# Patient Record
Sex: FEMALE | Race: OTHER | URBAN - METROPOLITAN AREA
[De-identification: names, ages, dates, MRNs, and addresses within clinical notes are randomized per-mention and may not be internally consistent; named-entity substitution may affect disease eponyms.]

---

## 2023-11-14 ENCOUNTER — OFFICE VISIT (OUTPATIENT)
Dept: URGENT CARE | Facility: CLINIC | Age: 63
End: 2023-11-14
Payer: MEDICARE

## 2023-11-14 VITALS
DIASTOLIC BLOOD PRESSURE: 88 MMHG | RESPIRATION RATE: 22 BRPM | OXYGEN SATURATION: 98 % | SYSTOLIC BLOOD PRESSURE: 138 MMHG | HEART RATE: 58 BPM | TEMPERATURE: 97.7 F

## 2023-11-14 DIAGNOSIS — M54.9 LEFT-SIDED BACK PAIN, UNSPECIFIED BACK LOCATION, UNSPECIFIED CHRONICITY: Primary | ICD-10-CM

## 2023-11-14 PROCEDURE — 99213 OFFICE O/P EST LOW 20 MIN: CPT | Performed by: PHYSICIAN ASSISTANT

## 2023-11-14 PROCEDURE — G0463 HOSPITAL OUTPT CLINIC VISIT: HCPCS | Performed by: PHYSICIAN ASSISTANT

## 2023-11-14 RX ORDER — HYDROCHLOROTHIAZIDE 25 MG/1
25 TABLET ORAL DAILY
COMMUNITY

## 2023-11-14 RX ORDER — MELOXICAM 15 MG/1
15 TABLET ORAL DAILY
COMMUNITY
Start: 2023-09-25

## 2023-11-14 RX ORDER — METHOCARBAMOL 500 MG/1
500 TABLET, FILM COATED ORAL 3 TIMES DAILY
Qty: 12 TABLET | Refills: 0 | Status: SHIPPED | OUTPATIENT
Start: 2023-11-14

## 2023-11-14 RX ORDER — ESOMEPRAZOLE MAGNESIUM 40 MG/1
CAPSULE, DELAYED RELEASE ORAL
COMMUNITY
Start: 2023-08-10

## 2023-11-14 RX ORDER — ERGOCALCIFEROL 1.25 MG/1
CAPSULE ORAL
COMMUNITY

## 2023-11-14 RX ORDER — BACLOFEN 10 MG/1
10 TABLET ORAL
COMMUNITY
Start: 2023-09-25 | End: 2023-11-14

## 2023-11-14 RX ORDER — IBUPROFEN 800 MG/1
TABLET ORAL
COMMUNITY
Start: 2023-09-25

## 2023-11-14 NOTE — PROGRESS NOTES
[facility]        NAME: Alecia Macias is a 70 y.o. female  : 1960    MRN: [id number]  DATE: 2023  TIME: 4:04 PM    Assessment and Plan   Left-sided back pain, unspecified back location, unspecified chronicity [M54.9]  1. Left-sided back pain, unspecified back location, unspecified chronicity  methocarbamol (ROBAXIN) 500 mg tablet            Patient Instructions   Take ibuprofen and Robaxin  Make sure you are taking the medication with food. Do not drive or operate heavy machinery while taking Robaxin  Patient is to apply heat 20 minutes on and than 20 minutes off. Patient can continue to use icy/hot or try otc lidocaine patches but neither should be applied while using heat. Rest (for no longer than 24 hours)  Stretching exercises  Alternate ice and heat  Consider physical therapy if no improvement after 1 week  F/u with PCP in 1 week if back pain is not improving  Follow up with chiropractic or orthopedic if continues >1 month  Report to the ER with worsening pain, lower extremity numbness/tingling/weakness or loss of bowel/bladder function. Follow up with PCP in 3-5 days. Proceed to  ER if symptoms worsen. Advised patient to hold baclofen while taking robaxin  Chief Complaint     Chief Complaint   Patient presents with    Back Pain     Pt c/o left side mid to lower back pain that started yesterday after driving for 10 hours in the car. History of Present Illness       HPI  This is a 47-year-old female here complaining of left-sided lower back pain since yesterday. Patient notes she sat in the car from [place] [facility] for 10 hours and then frequent to [facility] to visit family. From [facility] she drove to the [facility]. Patient says of sitting yesterday. She denies noting in the upper extremities, fever, chills, shortness of breath or chest pain. Patient denies any injury or trauma. She has been taking ibuprofen for symptoms.   She notes most of her pain occurs with movements which she rates an 8 out of 10. Review of Systems   Review of Systems   Constitutional:  Negative for chills and fever. Respiratory:  Negative for shortness of breath. Cardiovascular:  Negative for chest pain. Gastrointestinal:  Negative for diarrhea and vomiting. Musculoskeletal:  Positive for back pain. Neurological:  Negative for weakness and numbness. Current Medications       Current Outpatient Medications:     ergocalciferol (VITAMIN D2) 50,000 units, 1 CAP(S) ORALLY EVERY TWO WEEKS, Disp: , Rfl:     esomeprazole (NexIUM) 40 MG capsule, Do not open capsule., Disp: , Rfl:     hydrochlorothiazide (HYDRODIURIL) 25 mg tablet, Take 25 mg by mouth daily, Disp: , Rfl:     ibuprofen (MOTRIN) 800 mg tablet, 1 TAB(S) ORALLY 3 TIMES A DAY AS NEEDED FOR SEVERE PAIN 100 DAYS, Disp: , Rfl:     meloxicam (MOBIC) 15 mg tablet, Take 15 mg by mouth daily, Disp: , Rfl:     methocarbamol (ROBAXIN) 500 mg tablet, Take 1 tablet (500 mg total) by mouth 3 (three) times a day, Disp: 12 tablet, Rfl: 0    Current Allergies     Allergies as of 11/14/2023    (No Known Allergies)            The following portions of the patient's history were reviewed and updated as appropriate: allergies, current medications, past family history, past medical history, past social history, past surgical history and problem list.     Past Medical History:   Diagnosis Date    Arthritis     Hypertension        Past Surgical History:   Procedure Laterality Date    HERNIA REPAIR         History reviewed. No pertinent family history. Medications have been verified. Objective   /88   Pulse 58   Temp 97.7 °F (36.5 °C) (Oral)   Resp 22   SpO2 98%        Physical Exam     Physical Exam  Vitals and nursing note reviewed. Constitutional:       General: She is not in acute distress. Appearance: Normal appearance. She is not ill-appearing or toxic-appearing.    Cardiovascular:      Rate and Rhythm: Normal rate and regular rhythm. Pulmonary:      Effort: Pulmonary effort is normal.      Breath sounds: Normal breath sounds. Musculoskeletal:      Cervical back: Normal.      Thoracic back: Spasms and tenderness present. No swelling. Normal range of motion. Lumbar back: Spasms and tenderness present. No swelling. Normal range of motion. Comments: There is tenderness to palpation of the thoracic and lumbar left paraspinal muscles. Neurological:      Mental Status: She is alert.

## 2023-11-16 ENCOUNTER — OFFICE VISIT (OUTPATIENT)
Dept: URGENT CARE | Facility: CLINIC | Age: 63
End: 2023-11-16
Payer: MEDICARE

## 2023-11-16 VITALS
RESPIRATION RATE: 18 BRPM | TEMPERATURE: 97.1 F | SYSTOLIC BLOOD PRESSURE: 157 MMHG | HEART RATE: 59 BPM | DIASTOLIC BLOOD PRESSURE: 90 MMHG | OXYGEN SATURATION: 98 %

## 2023-11-16 DIAGNOSIS — M54.6 ACUTE LEFT-SIDED THORACIC BACK PAIN: Primary | ICD-10-CM

## 2023-11-16 LAB
SL AMB  POCT GLUCOSE, UA: NORMAL
SL AMB LEUKOCYTE ESTERASE,UA: NORMAL
SL AMB POCT BILIRUBIN,UA: NORMAL
SL AMB POCT BLOOD,UA: NORMAL
SL AMB POCT CLARITY,UA: NORMAL
SL AMB POCT COLOR,UA: YELLOW
SL AMB POCT KETONES,UA: NORMAL
SL AMB POCT NITRITE,UA: NORMAL
SL AMB POCT PH,UA: 5.5
SL AMB POCT SPECIFIC GRAVITY,UA: 1.01
SL AMB POCT URINE PROTEIN: NORMAL
SL AMB POCT UROBILINOGEN: 0.2

## 2023-11-16 PROCEDURE — 81002 URINALYSIS NONAUTO W/O SCOPE: CPT | Performed by: PHYSICIAN ASSISTANT

## 2023-11-16 PROCEDURE — G0463 HOSPITAL OUTPT CLINIC VISIT: HCPCS | Performed by: PHYSICIAN ASSISTANT

## 2023-11-16 PROCEDURE — 99214 OFFICE O/P EST MOD 30 MIN: CPT | Performed by: PHYSICIAN ASSISTANT

## 2023-11-16 NOTE — PROGRESS NOTES
[facility]        NAME: Devyn Hernandez is a 70 y.o. female  : 1960    MRN: [id number]  DATE: 2023  TIME: 4:31 PM      Assessment and Plan     Acute left-sided thoracic back pain [M54.6]  1. Acute left-sided thoracic back pain  POCT urine dip        Note:   I also agree with the [facility] pain as noted at the last visit. Patient should continue ibuprofen and robaxin. Urine dip negative in office. Patient Instructions   There are no Patient Instructions on file for this visit. Follow up with PCP in 3-5 days. Go to ER if symptoms worsen. Chief Complaint     Chief Complaint   Patient presents with    Back Pain     Lower back pain since Monday. Was driving home from [place] to [facility] . Left side and has shooting pain. Went to chiropractor this afternoon. They said she might have a uti. History of Present Illness     Patient presents with continuing left sided back pain since the last visit. She was diagnosed with left musculoskeletal back pain and given robaxin, which she states helps a little. She also has been taking motrin with little relief. She was at her chiropractor this afternoon and he suggested she may have a UTI, so she should get that checked out. She denies urinary symptoms. Back Pain  Pertinent negatives include no abdominal pain, chest pain, dysuria, fever, headaches or numbness. Review of Systems     Review of Systems   Constitutional:  Negative for chills, fatigue and fever. HENT:  Negative for congestion, ear pain, postnasal drip, rhinorrhea, sinus pressure, sinus pain and sore throat. Eyes:  Negative for pain and visual disturbance. Respiratory:  Negative for cough, chest tightness and shortness of breath. Cardiovascular:  Negative for chest pain and palpitations. Gastrointestinal:  Negative for abdominal pain, diarrhea, nausea and vomiting. Genitourinary:  Negative for dysuria, flank pain, frequency, hematuria and urgency. Musculoskeletal:  Positive for back pain. Negative for arthralgias and myalgias. Skin:  Negative for rash. Neurological:  Negative for dizziness, seizures, syncope, numbness and headaches. All other systems reviewed and are negative. Current Medications       Current Outpatient Medications:     ergocalciferol (VITAMIN D2) 50,000 units, 1 CAP(S) ORALLY EVERY TWO WEEKS, Disp: , Rfl:     esomeprazole (NexIUM) 40 MG capsule, Do not open capsule., Disp: , Rfl:     hydrochlorothiazide (HYDRODIURIL) 25 mg tablet, Take 25 mg by mouth daily, Disp: , Rfl:     ibuprofen (MOTRIN) 800 mg tablet, 1 TAB(S) ORALLY 3 TIMES A DAY AS NEEDED FOR SEVERE PAIN 100 DAYS, Disp: , Rfl:     meloxicam (MOBIC) 15 mg tablet, Take 15 mg by mouth daily, Disp: , Rfl:     methocarbamol (ROBAXIN) 500 mg tablet, Take 1 tablet (500 mg total) by mouth 3 (three) times a day, Disp: 12 tablet, Rfl: 0    Current Allergies     Allergies as of 11/16/2023    (No Known Allergies)              The following portions of the patient's history were reviewed and updated as appropriate: allergies, current medications, past family history, past medical history, past social history, past surgical history, and problem list.     Past Medical History:   Diagnosis Date    Arthritis     Hypertension        Past Surgical History:   Procedure Laterality Date    HERNIA REPAIR         History reviewed. No pertinent family history. Medications have been verified. Objective     /90   Pulse 59   Temp (!) 97.1 °F (36.2 °C)   Resp 18   SpO2 98%   No LMP recorded. Physical Exam     Physical Exam  Vitals and nursing note reviewed. Constitutional:       Appearance: Normal appearance. She is obese. HENT:      Head: Normocephalic and atraumatic. Cardiovascular:      Rate and Rhythm: Normal rate and regular rhythm. Heart sounds: Normal heart sounds.    Pulmonary:      Effort: Pulmonary effort is normal.      Breath sounds: Normal breath sounds. Abdominal:      Tenderness: There is no right CVA tenderness or left CVA tenderness. Musculoskeletal:      Thoracic back: Spasms and tenderness present. Normal range of motion. Lumbar back: No spasms or tenderness. Normal range of motion. Back:    Skin:     General: Skin is warm and dry. Neurological:      General: No focal deficit present. Mental Status: She is alert and oriented to person, place, and time.    Psychiatric:         Mood and Affect: Mood normal.         Behavior: Behavior normal.